# Patient Record
Sex: FEMALE | Race: WHITE | Employment: OTHER | URBAN - METROPOLITAN AREA
[De-identification: names, ages, dates, MRNs, and addresses within clinical notes are randomized per-mention and may not be internally consistent; named-entity substitution may affect disease eponyms.]

---

## 2017-09-14 ENCOUNTER — TELEPHONE (OUTPATIENT)
Dept: INTERNAL MEDICINE CLINIC | Age: 72
End: 2017-09-14

## 2017-10-30 ENCOUNTER — TELEPHONE (OUTPATIENT)
Dept: INTERNAL MEDICINE CLINIC | Age: 72
End: 2017-10-30

## 2017-12-22 ENCOUNTER — OFFICE VISIT (OUTPATIENT)
Dept: RHEUMATOLOGY | Age: 72
End: 2017-12-22

## 2017-12-22 ENCOUNTER — HOSPITAL ENCOUNTER (OUTPATIENT)
Dept: OTHER | Age: 72
Discharge: HOME | End: 2017-12-22
Attending: INTERNAL MEDICINE | Admitting: INTERNAL MEDICINE

## 2017-12-22 VITALS
DIASTOLIC BLOOD PRESSURE: 72 MMHG | SYSTOLIC BLOOD PRESSURE: 130 MMHG | HEIGHT: 61 IN | WEIGHT: 125 LBS | HEART RATE: 88 BPM | BODY MASS INDEX: 23.6 KG/M2

## 2017-12-22 DIAGNOSIS — I26.99 OTHER ACUTE PULMONARY EMBOLISM WITHOUT ACUTE COR PULMONALE (HCC): ICD-10-CM

## 2017-12-22 DIAGNOSIS — R10.12 LEFT UPPER QUADRANT PAIN: ICD-10-CM

## 2017-12-22 DIAGNOSIS — M05.79 RHEUMATOID ARTHRITIS INVOLVING MULTIPLE SITES WITH POSITIVE RHEUMATOID FACTOR (HCC): Primary | ICD-10-CM

## 2017-12-22 DIAGNOSIS — M05.79 RHEUMATOID ARTHRITIS INVOLVING MULTIPLE SITES WITH POSITIVE RHEUMATOID FACTOR (HCC): ICD-10-CM

## 2017-12-22 LAB
A/G RATIO: 1 (ref 1.1–2.2)
ALBUMIN SERPL-MCNC: 3.9 G/DL (ref 3.4–5)
ALP BLD-CCNC: 80 U/L (ref 40–129)
ALT SERPL-CCNC: 14 U/L (ref 10–40)
ANION GAP SERPL CALCULATED.3IONS-SCNC: 10 MMOL/L (ref 3–16)
AST SERPL-CCNC: 24 U/L (ref 15–37)
BASOPHILS ABSOLUTE: 0.1 K/UL (ref 0–0.2)
BASOPHILS RELATIVE PERCENT: 1.3 %
BILIRUB SERPL-MCNC: 0.4 MG/DL (ref 0–1)
BUN BLDV-MCNC: 15 MG/DL (ref 7–20)
C-REACTIVE PROTEIN: 3 MG/L (ref 0–5.1)
CALCIUM SERPL-MCNC: 9.8 MG/DL (ref 8.3–10.6)
CHLORIDE BLD-SCNC: 105 MMOL/L (ref 99–110)
CO2: 29 MMOL/L (ref 21–32)
CREAT SERPL-MCNC: 0.7 MG/DL (ref 0.6–1.2)
EOSINOPHILS ABSOLUTE: 0.2 K/UL (ref 0–0.6)
EOSINOPHILS RELATIVE PERCENT: 2.6 %
GFR AFRICAN AMERICAN: >60
GFR NON-AFRICAN AMERICAN: >60
GLOBULIN: 3.8 G/DL
GLUCOSE BLD-MCNC: 105 MG/DL (ref 70–99)
HCT VFR BLD CALC: 38.3 % (ref 36–48)
HEMOGLOBIN: 12.6 G/DL (ref 12–16)
HEPATITIS B CORE IGM ANTIBODY: NORMAL
HEPATITIS B SURFACE ANTIGEN INTERPRETATION: NORMAL
HEPATITIS C ANTIBODY INTERPRETATION: NORMAL
LYMPHOCYTES ABSOLUTE: 1.1 K/UL (ref 1–5.1)
LYMPHOCYTES RELATIVE PERCENT: 14.6 %
MCH RBC QN AUTO: 29.8 PG (ref 26–34)
MCHC RBC AUTO-ENTMCNC: 32.8 G/DL (ref 31–36)
MCV RBC AUTO: 90.8 FL (ref 80–100)
MONOCYTES ABSOLUTE: 0.4 K/UL (ref 0–1.3)
MONOCYTES RELATIVE PERCENT: 5.3 %
NEUTROPHILS ABSOLUTE: 5.9 K/UL (ref 1.7–7.7)
NEUTROPHILS RELATIVE PERCENT: 76.2 %
PDW BLD-RTO: 14.5 % (ref 12.4–15.4)
PLATELET # BLD: 286 K/UL (ref 135–450)
PMV BLD AUTO: 9.6 FL (ref 5–10.5)
POTASSIUM SERPL-SCNC: 4.7 MMOL/L (ref 3.5–5.1)
RBC # BLD: 4.22 M/UL (ref 4–5.2)
SEDIMENTATION RATE, ERYTHROCYTE: 48 MM/HR (ref 0–30)
SODIUM BLD-SCNC: 144 MMOL/L (ref 136–145)
TOTAL PROTEIN: 7.7 G/DL (ref 6.4–8.2)
WBC # BLD: 7.8 K/UL (ref 4–11)

## 2017-12-22 PROCEDURE — G8484 FLU IMMUNIZE NO ADMIN: HCPCS | Performed by: INTERNAL MEDICINE

## 2017-12-22 PROCEDURE — G8400 PT W/DXA NO RESULTS DOC: HCPCS | Performed by: INTERNAL MEDICINE

## 2017-12-22 PROCEDURE — 1123F ACP DISCUSS/DSCN MKR DOCD: CPT | Performed by: INTERNAL MEDICINE

## 2017-12-22 PROCEDURE — G8598 ASA/ANTIPLAT THER USED: HCPCS | Performed by: INTERNAL MEDICINE

## 2017-12-22 PROCEDURE — 99215 OFFICE O/P EST HI 40 MIN: CPT | Performed by: INTERNAL MEDICINE

## 2017-12-22 PROCEDURE — 4004F PT TOBACCO SCREEN RCVD TLK: CPT | Performed by: INTERNAL MEDICINE

## 2017-12-22 PROCEDURE — 3017F COLORECTAL CA SCREEN DOC REV: CPT | Performed by: INTERNAL MEDICINE

## 2017-12-22 PROCEDURE — G8420 CALC BMI NORM PARAMETERS: HCPCS | Performed by: INTERNAL MEDICINE

## 2017-12-22 PROCEDURE — 3014F SCREEN MAMMO DOC REV: CPT | Performed by: INTERNAL MEDICINE

## 2017-12-22 PROCEDURE — 1090F PRES/ABSN URINE INCON ASSESS: CPT | Performed by: INTERNAL MEDICINE

## 2017-12-22 PROCEDURE — G8427 DOCREV CUR MEDS BY ELIG CLIN: HCPCS | Performed by: INTERNAL MEDICINE

## 2017-12-22 PROCEDURE — 4040F PNEUMOC VAC/ADMIN/RCVD: CPT | Performed by: INTERNAL MEDICINE

## 2017-12-22 NOTE — PROGRESS NOTES
2017  Patient Name: Jatinder Durbin  : 1945  Medical Record: [id number]    MEDICATIONS  Current Outpatient Prescriptions   Medication Sig Dispense Refill    rivaroxaban (XARELTO) 15 MG TABS tablet Take 15 mg by mouth 2 times daily PT said that when she finishes the bottle it will change to 20 mg 1 per day      predniSONE (DELTASONE) 5 MG tablet Take 2 tablets by mouth  daily 180 tablet 3    morphine (MSIR) 30 MG tablet Take 30 mg by mouth 3 times daily.  morphine (MSIR) 15 MG tablet Take 15 mg by mouth 3 times daily.  docusate sodium (COLACE) 100 MG capsule Take 100 mg by mouth 2 times daily.  pravastatin (PRAVACHOL) 80 MG tablet Take 80 mg by mouth daily.  clonazepam (KLONOPIN) 0.5 MG tablet Take 0.5 mg by mouth 3 times daily as needed. No current facility-administered medications for this visit. ALLERGIES  Allergies   Allergen Reactions    Leflunomide Other (See Comments)     Increased pain     Oxycontin [Oxycodone Hcl]     Sulfa Antibiotics          Comments  No specialty comments available. REFERRING PHYSICIAN: Melissa Pizarro MD     HISTORY OF PRESENT ILLNESS  Jatinder Durbin is a 71 y.o. female who is being seen for follow up evaluation of Rheumatoid arthritis. Patient is a transfer from Dr. Leslie King. Dr. Walker Serum notes were reviewed. She was diagnosed with rheumatoid arthritis in . She has been on prednisone 5 mg per day since . She has last seen Dr. Leslie King in 2015. She has been reluctant to take DMARD's. She was started on methotrexate, stopped it after taking 1 dose. She was placed on leflunomide, stopped taking it due to severe body aches which she attributed to leflunomide. She feels she is doing better on prednisone 5 mg per day. She doubles the dose when she gets a flareup. She has pain and swelling mainly in the wrists and stiffness in the morning lasting for 2 hours. She has difficulty opening doorknobs and jar cans. She has occasional pain in elbows and knees. She was recently diagnosed with pulmonary embolism. She was started on Xarelto. She is also complaining of pain in the left upper quadrant which is worse with sitting, leaning forward and rolling over. HPI  ROS    REVIEW OF SYSTEMS: Positive for dry mouth, pulmonary embolism  Constitutional: No unanticipated weight loss or fevers. No fatigue and malaise. Integumentary: No rash, photosensitivity, malar rash, livedo reticularis, alopecia and Raynaud's symptoms, sclerodactyly, skin tightening  Eyes: negative for dry eyes, visual disturbance and persistent redness, discharge from eyes   ENT: - No tinnitus, loss of hearing, vertigo, or recurrent ear infections.  - No history of nasal/oral ulcers. Cardiovascular: No history of pericarditis, chest pain or murmur or palpitations  Respiratory: No shortness of breath, cough or history of interstitial lung disease. No history of pleurisy. No history of tuberculosis or atypical infections. Gastrointestinal: No history of heart burn, dysphagia or esophageal dysmotility. No change in bowel habits or any inflammatory bowel disease. Genitourinary: No history renal disease, miscarriages. Hematologic/Lymphatic: No abnormal bruising or bleeding,  swollen lymph nodes. Neurological: No history seizure or focal weakness. No history of neuropathies, paresthesias or hyperesthesias, facial droop, diplopia  Psychiatric: No history of bipolar disease, anxiety, depression  Endocrine: Denies any thyroid / parathyroid disease and osteoporosis  Allergic/Immunologic: No nasal congestion or hives. I have reviewed patients Past medical History, Social History and Family History as mentioned in her chart and this remains unchanged from previous.     Past Medical History:   Diagnosis Date    Arthritis     CAD (coronary artery disease)     Coronary atherosclerosis of native coronary vessel     Headache(784.0)     Hyperlipidemia     Hypertension     Pulmonary embolism (HCC)     Type II or unspecified type diabetes mellitus without mention of complication, not stated as uncontrolled      Past Surgical History:   Procedure Laterality Date    BACK SURGERY      CARDIAC CATHETERIZATION      HAND SURGERY      HIP SURGERY      HYSTERECTOMY      KNEE SURGERY      WRIST SURGERY       Social History     Social History    Marital status:      Spouse name: N/A    Number of children: N/A    Years of education: N/A     Occupational History    Not on file.      Social History Main Topics    Smoking status: Current Some Day Smoker     Packs/day: 1.00     Years: 35.00     Types: E-Cigarettes, Cigarettes    Smokeless tobacco: Never Used    Alcohol use No    Drug use: No    Sexual activity: Not on file     Other Topics Concern    Not on file     Social History Narrative    No narrative on file     Family History   Problem Relation Age of Onset    Early Death Father     Heart Disease Father     Cancer Mother          PHYSICAL EXAM   Vitals:    12/22/17 0950   BP: 130/72   Pulse: 88   Weight: 125 lb (56.7 kg)   Height: 5' 1\" (1.549 m)     Physical Exam  Constitutional:  Well developed, well nourished, no acute distress, non-toxic appearance   Musculoskeletal:     RIGHT  Swell  Tender  ROM  LEFT  Swell  Tender  ROM    DIP2  0  0  FULL   0  0  FULL    DIP3  0  0  FULL   0  0  FULL    DIP4  0  0  FULL   0  0  FULL    DIP5  0  0  FULL   0  0  FULL    PIP1  0  0  FULL   0  0  FULL    PIP2  0  0  FULL   0  0  FULL    PIP3  0  0  FULL   0  0  FULL    PIP4  0  0  FULL   0  0  FULL    PIP5  0  0  FULL   0  0  FULL    MCP1  0  0  FULL   0  0  FULL    MCP2  0  0  FULL   0  0  FULL    MCP3  0  0  FULL   0  0  FULL    MCP4  0  0  FULL   0  0  FULL    MCP5  0  0  FULL   0  0  FULL    Wrist  + + decreased  + + decreased   Elbow  0  0  FULL   0  0  FULL    Shouldr  0  0  FULL   0  0  FULL    Hip  0  0  FULL   0  0  FULL    Knee  0  0  FULL   0  0  FULL Ankle  0  0  FULL   0  0  FULL    MTP1  0  0  FULL   0  0  FULL    MTP2  0  0  FULL   0  0  FULL    MTP3  0  0  FULL   0  0  FULL    MTP4  0  0  FULL   0  0  FULL    MTP5  0  0  FULL   0  0  FULL    IP1  0  0  FULL   0  0  FULL    IP2  0  0  FULL   0  0  FULL    IP3  0  0  FULL   0  0  FULL    IP4  0  0  FULL   0  0  FULL    IP5  0  0  FULL   0 0 FULL       Ambulates without assistance, normal gait  Neck: Full ROM, no tenderness, supple   Back- no tenderness. Eyes:  PERRL, extra ocular movements intact, conjunctiva normal   HEENT:  Atraumatic, normocephalic, external ears normal, oropharynx moist, no pharyngeal exudates. Respiratory:  No respiratory distress  GI:  Soft, nondistended, normal bowel sounds, nontender, no organomegaly, no mass, no rebound, no guarding   :  No costovertebral angle tenderness   Integument:  Well hydrated, no rash or telangiectasias  Lymphatic:  No lymphadenopathy noted   Neurologic:   Alert & oriented x 3, CN 2-12 normal, no focal deficits noted. Sensations Intact. Muscle strength 5/5 proximally and distally in upper and lower extremities.    Psychiatric:  Speech and behavior appropriate           LABS AND IMAGING  Outside data reviewed and in HPI    Lab Results   Component Value Date    WBC 10.5 08/03/2016    RBC 4.56 08/03/2016    HGB 13.4 08/03/2016    HCT 40.2 08/03/2016     08/03/2016    MCV 88.3 08/03/2016    MCH 29.4 08/03/2016    MCHC 33.3 08/03/2016    RDW 14.1 08/03/2016    LYMPHOPCT 12.3 08/03/2016    MONOPCT 4.3 08/03/2016    BASOPCT 2.7 08/03/2016    MONOSABS 0.5 08/03/2016    LYMPHSABS 1.3 08/03/2016    EOSABS 0.1 08/03/2016    BASOSABS 0.3 08/03/2016       Chemistry        Component Value Date/Time     08/03/2016 1330    K 4.4 08/03/2016 1330     08/03/2016 1330    CO2 25 08/03/2016 1330    BUN 14 08/03/2016 1330    CREATININE 0.7 08/03/2016 1330        Component Value Date/Time    CALCIUM 9.9 08/03/2016 1330    ALKPHOS 113 08/03/2016 1330    AST

## 2017-12-24 LAB
ANA BY IFA: ABNORMAL
ANTICARDIOLIPIN IGA ANTIBODY: 0 APL (ref 0–11)
ANTICARDIOLIPIN IGG ANTIBODY: 4 GPL (ref 0–14)
CARDIOLIPIN AB IGM: 41 MPL (ref 0–12)

## 2017-12-25 LAB
BETA-2 GLYCOPROTEIN 1 IGG ANTIBODY: 0 SGU (ref 0–20)
BETA-2 GLYCOPROTEIN 1 IGM ANTIBODY: 8 SMU (ref 0–20)

## 2017-12-26 DIAGNOSIS — R76.8 POSITIVE ANA (ANTINUCLEAR ANTIBODY): Primary | ICD-10-CM

## 2017-12-26 LAB
DRVVT CONFIRMATION TEST: NEGATIVE RATIO
DRVVT SCREEN: 82 SEC (ref 33–44)
DRVVT,DIL: 65 SEC (ref 33–44)
HEXAGONAL PHOSPHOLIPID NEUTRALIZAT TEST: ABNORMAL
LUPUS ANTICOAG INTERP: ABNORMAL
PLT NEUTA: POSITIVE
PT D: 22.7 SEC (ref 12–15.5)
PTT D: 78 SEC (ref 32–48)
PTT-D CORR REFLEX: 56 SEC (ref 32–48)
PTT-HEPARIN NEUTRALIZED: ABNORMAL SEC (ref 32–48)
REPTILASE TIME: ABNORMAL SEC
THROMBIN TIME: 18 SEC (ref 14.7–19.5)

## 2017-12-26 NOTE — PROGRESS NOTES
Please call the patient and let her know that she has elevated inflammatory markers and positive lupus test.  We will do more blood work for further evaluation.

## 2017-12-29 ENCOUNTER — HOSPITAL ENCOUNTER (OUTPATIENT)
Dept: ULTRASOUND IMAGING | Age: 72
Discharge: HOME | End: 2017-12-29
Attending: INTERNAL MEDICINE | Admitting: INTERNAL MEDICINE

## 2017-12-29 DIAGNOSIS — R76.8 POSITIVE ANA (ANTINUCLEAR ANTIBODY): ICD-10-CM

## 2017-12-29 DIAGNOSIS — R10.12 LEFT UPPER QUADRANT PAIN: ICD-10-CM

## 2017-12-29 LAB
C3 COMPLEMENT: 116.1 MG/DL (ref 90–180)
C4 COMPLEMENT: 19 MG/DL (ref 10–40)

## 2017-12-31 LAB
DOUBLE STRANDED DNA AB, IGG: DETECTED
ENA TO SMITH (SM) ANTIBODY: 0 AU/ML (ref 0–40)
SSA 52 (RO) (ENA) AB, IGG: 9 AU/ML (ref 0–40)
SSA 60 (RO) (ENA) AB, IGG: 1 AU/ML (ref 0–40)

## 2018-01-01 LAB
DSDNA ANTIBODY TITER: ABNORMAL
ENA TO RNP ANTIBODY: NEGATIVE EU
ENA TO SMITH (SM) ANTIBODY: NEGATIVE EU

## 2018-01-02 DIAGNOSIS — K83.8 DILATED BILE DUCT: Primary | ICD-10-CM

## 2018-01-02 NOTE — PROGRESS NOTES
Please call and let her know blood test is positive for lupus  Continue same meds.  Will discuss treatment options at follow up

## 2018-01-02 NOTE — PROGRESS NOTES
Please call the patient and let her know ultrasound shows dilated common bile duct.  I will refer to surgery for further evaluation

## 2018-01-03 ENCOUNTER — TELEPHONE (OUTPATIENT)
Dept: RHEUMATOLOGY | Age: 73
End: 2018-01-03

## 2018-01-03 DIAGNOSIS — K83.8 DILATED BILE DUCT: Primary | ICD-10-CM

## 2018-01-10 ENCOUNTER — OFFICE VISIT (OUTPATIENT)
Dept: SURGERY | Age: 73
End: 2018-01-10

## 2018-01-10 VITALS
WEIGHT: 127 LBS | SYSTOLIC BLOOD PRESSURE: 120 MMHG | HEIGHT: 62 IN | BODY MASS INDEX: 23.37 KG/M2 | DIASTOLIC BLOOD PRESSURE: 72 MMHG

## 2018-01-10 DIAGNOSIS — K83.8 DILATED BILE DUCT: Primary | ICD-10-CM

## 2018-01-10 PROCEDURE — 3017F COLORECTAL CA SCREEN DOC REV: CPT | Performed by: SURGERY

## 2018-01-10 PROCEDURE — 99204 OFFICE O/P NEW MOD 45 MIN: CPT | Performed by: SURGERY

## 2018-01-10 PROCEDURE — 3014F SCREEN MAMMO DOC REV: CPT | Performed by: SURGERY

## 2018-01-10 PROCEDURE — 4040F PNEUMOC VAC/ADMIN/RCVD: CPT | Performed by: SURGERY

## 2018-01-10 PROCEDURE — G8427 DOCREV CUR MEDS BY ELIG CLIN: HCPCS | Performed by: SURGERY

## 2018-01-10 PROCEDURE — G8484 FLU IMMUNIZE NO ADMIN: HCPCS | Performed by: SURGERY

## 2018-01-10 PROCEDURE — G8420 CALC BMI NORM PARAMETERS: HCPCS | Performed by: SURGERY

## 2018-01-10 PROCEDURE — 1090F PRES/ABSN URINE INCON ASSESS: CPT | Performed by: SURGERY

## 2018-01-10 ASSESSMENT — ENCOUNTER SYMPTOMS
ABDOMINAL PAIN: 1
CONSTIPATION: 1
RESPIRATORY NEGATIVE: 1
EYES NEGATIVE: 1
ALLERGIC/IMMUNOLOGIC NEGATIVE: 1
BACK PAIN: 1
BLOOD IN STOOL: 1

## 2018-01-10 NOTE — PROGRESS NOTES
Subjective:      Kera Patiño is a 71 y.o. female     CC: Dilated bile duct    HPI: 50-year-old female who was recently diagnosed with a pulmonary embolus who is here for evaluation of a dilated bile duct. She was having some symptoms of epigastric discomfort. The pain was very short in duration and seemed to be positional.  Right upper quadrant ultrasound showed a dilated bile duct. There were no gallstones noted. She has had no history of jaundice, acholic stools or dark urine. No history of nausea, vomiting, anorexia, weight loss or change in bowel habits. [unfilled]    Prior to Visit Medications    Medication Sig Taking? Authorizing Provider   rivaroxaban (XARELTO) 15 MG TABS tablet Take 15 mg by mouth 2 times daily PT said that when she finishes the bottle it will change to 20 mg 1 per day Yes Historical Provider, MD   morphine (MSIR) 30 MG tablet Take 30 mg by mouth 3 times daily. Yes Historical Provider, MD   morphine (MSIR) 15 MG tablet Take 15 mg by mouth 3 times daily. Yes Historical Provider, MD   docusate sodium (COLACE) 100 MG capsule Take 100 mg by mouth 2 times daily. Yes Historical Provider, MD   pravastatin (PRAVACHOL) 80 MG tablet Take 80 mg by mouth daily. Yes Historical Provider, MD   clonazepam (KLONOPIN) 0.5 MG tablet Take 0.5 mg by mouth 3 times daily as needed. Yes Historical Provider, MD   predniSONE (DELTASONE) 5 MG tablet Take 2 tablets by mouth  daily  Amarjit Killian MD       Social History     Social History    Marital status:      Spouse name: N/A    Number of children: N/A    Years of education: N/A     Occupational History    Not on file.      Social History Main Topics    Smoking status: Current Some Day Smoker     Packs/day: 1.00     Years: 35.00     Types: E-Cigarettes, Cigarettes    Smokeless tobacco: Never Used    Alcohol use No    Drug use: No    Sexual activity: Not on file     Other Topics Concern    Not on file     Social History

## 2018-02-12 ENCOUNTER — OFFICE VISIT (OUTPATIENT)
Dept: RHEUMATOLOGY | Age: 73
End: 2018-02-12

## 2018-02-12 VITALS
SYSTOLIC BLOOD PRESSURE: 110 MMHG | HEIGHT: 61 IN | DIASTOLIC BLOOD PRESSURE: 80 MMHG | WEIGHT: 120 LBS | BODY MASS INDEX: 22.66 KG/M2

## 2018-02-12 DIAGNOSIS — M05.79 RHEUMATOID ARTHRITIS INVOLVING MULTIPLE SITES WITH POSITIVE RHEUMATOID FACTOR (HCC): Primary | ICD-10-CM

## 2018-02-12 DIAGNOSIS — M32.9 SLE (SYSTEMIC LUPUS ERYTHEMATOSUS RELATED SYNDROME) (HCC): ICD-10-CM

## 2018-02-12 DIAGNOSIS — R82.998 CALCIUM OXALATE CRYSTALS IN URINE: Primary | ICD-10-CM

## 2018-02-12 LAB
ALT SERPL-CCNC: 20 U/L (ref 10–40)
AST SERPL-CCNC: 27 U/L (ref 15–37)
BASOPHILS ABSOLUTE: 0.1 K/UL (ref 0–0.2)
BASOPHILS RELATIVE PERCENT: 0.8 %
BILIRUBIN URINE: ABNORMAL
BLOOD, URINE: NEGATIVE
CLARITY: ABNORMAL
COLOR: YELLOW
CREAT SERPL-MCNC: 0.7 MG/DL (ref 0.6–1.2)
CREATININE URINE: 117.6 MG/DL (ref 28–259)
CRYSTALS, UA: ABNORMAL /HPF
EOSINOPHILS ABSOLUTE: 0.1 K/UL (ref 0–0.6)
EOSINOPHILS RELATIVE PERCENT: 0.7 %
EPITHELIAL CELLS, UA: 1 /HPF (ref 0–5)
GFR AFRICAN AMERICAN: >60
GFR NON-AFRICAN AMERICAN: >60
GLUCOSE URINE: NEGATIVE MG/DL
HCT VFR BLD CALC: 37.6 % (ref 36–48)
HEMOGLOBIN: 12.7 G/DL (ref 12–16)
HYALINE CASTS: 0 /LPF (ref 0–8)
KETONES, URINE: NEGATIVE MG/DL
LEUKOCYTE ESTERASE, URINE: NEGATIVE
LYMPHOCYTES ABSOLUTE: 1.1 K/UL (ref 1–5.1)
LYMPHOCYTES RELATIVE PERCENT: 10.8 %
MCH RBC QN AUTO: 29.4 PG (ref 26–34)
MCHC RBC AUTO-ENTMCNC: 33.8 G/DL (ref 31–36)
MCV RBC AUTO: 87 FL (ref 80–100)
MICROSCOPIC EXAMINATION: YES
MONOCYTES ABSOLUTE: 0.6 K/UL (ref 0–1.3)
MONOCYTES RELATIVE PERCENT: 6 %
NEUTROPHILS ABSOLUTE: 8.4 K/UL (ref 1.7–7.7)
NEUTROPHILS RELATIVE PERCENT: 81.7 %
NITRITE, URINE: NEGATIVE
PDW BLD-RTO: 14.5 % (ref 12.4–15.4)
PH UA: 5.5
PLATELET # BLD: 268 K/UL (ref 135–450)
PMV BLD AUTO: 9.4 FL (ref 5–10.5)
PROTEIN PROTEIN: 12 MG/DL
PROTEIN UA: NEGATIVE MG/DL
RBC # BLD: 4.32 M/UL (ref 4–5.2)
RBC UA: 3 /HPF (ref 0–4)
SPECIFIC GRAVITY UA: 1.02
URINE REFLEX TO CULTURE: ABNORMAL
URINE TYPE: ABNORMAL
UROBILINOGEN, URINE: 0.2 E.U./DL
WBC # BLD: 10.2 K/UL (ref 4–11)
WBC UA: 1 /HPF (ref 0–5)

## 2018-02-12 PROCEDURE — 3017F COLORECTAL CA SCREEN DOC REV: CPT | Performed by: INTERNAL MEDICINE

## 2018-02-12 PROCEDURE — 4004F PT TOBACCO SCREEN RCVD TLK: CPT | Performed by: INTERNAL MEDICINE

## 2018-02-12 PROCEDURE — 99215 OFFICE O/P EST HI 40 MIN: CPT | Performed by: INTERNAL MEDICINE

## 2018-02-12 PROCEDURE — G8400 PT W/DXA NO RESULTS DOC: HCPCS | Performed by: INTERNAL MEDICINE

## 2018-02-12 PROCEDURE — G8427 DOCREV CUR MEDS BY ELIG CLIN: HCPCS | Performed by: INTERNAL MEDICINE

## 2018-02-12 PROCEDURE — 1123F ACP DISCUSS/DSCN MKR DOCD: CPT | Performed by: INTERNAL MEDICINE

## 2018-02-12 PROCEDURE — G8420 CALC BMI NORM PARAMETERS: HCPCS | Performed by: INTERNAL MEDICINE

## 2018-02-12 PROCEDURE — G8484 FLU IMMUNIZE NO ADMIN: HCPCS | Performed by: INTERNAL MEDICINE

## 2018-02-12 PROCEDURE — G8598 ASA/ANTIPLAT THER USED: HCPCS | Performed by: INTERNAL MEDICINE

## 2018-02-12 PROCEDURE — 3014F SCREEN MAMMO DOC REV: CPT | Performed by: INTERNAL MEDICINE

## 2018-02-12 PROCEDURE — 1090F PRES/ABSN URINE INCON ASSESS: CPT | Performed by: INTERNAL MEDICINE

## 2018-02-12 PROCEDURE — 4040F PNEUMOC VAC/ADMIN/RCVD: CPT | Performed by: INTERNAL MEDICINE

## 2018-02-12 RX ORDER — FOLIC ACID 1 MG/1
1 TABLET ORAL DAILY
Qty: 30 TABLET | Refills: 5 | Status: SHIPPED | OUTPATIENT
Start: 2018-02-12 | End: 2018-05-21

## 2018-02-12 RX ORDER — HYDROXYCHLOROQUINE SULFATE 200 MG/1
200 TABLET, FILM COATED ORAL 2 TIMES DAILY
Qty: 30 TABLET | Refills: 5 | Status: SHIPPED | OUTPATIENT
Start: 2018-02-12 | End: 2018-06-07 | Stop reason: SDUPTHER

## 2018-02-12 NOTE — PROGRESS NOTES
PHYSICAL EXAM   Vitals:    02/12/18 1306   BP: 110/80   Weight: 120 lb (54.4 kg)   Height: 5' 1\" (1.549 m)     Physical Exam  Constitutional:  Well developed, well nourished, no acute distress, non-toxic appearance   Musculoskeletal:     RIGHT  Swell  Tender  ROM  LEFT  Swell  Tender  ROM    DIP2  0  0  FULL   0  0  FULL    DIP3  0  0  FULL   0  0  FULL    DIP4  0  0  FULL   0  0  FULL    DIP5  0  0  FULL   0  0  FULL    PIP1  0  0  FULL   0  0  FULL    PIP2  0  0  FULL   0  0  FULL    PIP3  0  0  FULL   0  0  FULL    PIP4  0  0  FULL   0  0  FULL    PIP5  0  0  FULL   0  0  FULL    MCP1  0  0  FULL   0  0  FULL    MCP2  0  0  FULL   0  0  FULL    MCP3  0  0  FULL   0  0  FULL    MCP4  0  0  FULL   0  0  FULL    MCP5  0  0  FULL   0  0  FULL    Wrist  + + decreased  + + decreased   Elbow  0  0  FULL   0  0  FULL    Shouldr  0  0  FULL   0  0  FULL    Hip  0  0  FULL   0  0  FULL    Knee  0  0  FULL   0  0  FULL    Ankle  0  0  FULL   0  0  FULL    MTP1  0  0  FULL   0  0  FULL    MTP2  0  0  FULL   0  0  FULL    MTP3  0  0  FULL   0  0  FULL    MTP4  0  0  FULL   0  0  FULL    MTP5  0  0  FULL   0  0  FULL    IP1  0  0  FULL   0  0  FULL    IP2  0  0  FULL   0  0  FULL    IP3  0  0  FULL   0  0  FULL    IP4  0  0  FULL   0  0  FULL    IP5  0  0  FULL   0 0 FULL       Ambulates without assistance, normal gait  Neck: Full ROM, no tenderness, supple   Back- no tenderness. Eyes:  PERRL, extra ocular movements intact, conjunctiva normal   HEENT:  Atraumatic, normocephalic, external ears normal, oropharynx moist, no pharyngeal exudates. Respiratory:  No respiratory distress  GI:  Soft, nondistended, normal bowel sounds, nontender, no organomegaly, no mass, no rebound, no guarding   :  No costovertebral angle tenderness   Integument:  Well hydrated, no rash or telangiectasias  Lymphatic:  No lymphadenopathy noted   Neurologic:   Alert & oriented x 3, CN 2-12 normal, no focal deficits noted. Sensations Intact. Muscle strength 5/5 proximally and distally in upper and lower extremities. Psychiatric:  Speech and behavior appropriate           LABS AND IMAGING  Outside data reviewed and in HPI    Lab Results   Component Value Date    WBC 7.8 12/22/2017    RBC 4.22 12/22/2017    HGB 12.6 12/22/2017    HCT 38.3 12/22/2017     12/22/2017    MCV 90.8 12/22/2017    MCH 29.8 12/22/2017    MCHC 32.8 12/22/2017    RDW 14.5 12/22/2017    LYMPHOPCT 14.6 12/22/2017    MONOPCT 5.3 12/22/2017    BASOPCT 1.3 12/22/2017    MONOSABS 0.4 12/22/2017    LYMPHSABS 1.1 12/22/2017    EOSABS 0.2 12/22/2017    BASOSABS 0.1 12/22/2017       Chemistry        Component Value Date/Time     12/22/2017 1141    K 4.7 12/22/2017 1141     12/22/2017 1141    CO2 29 12/22/2017 1141    BUN 15 12/22/2017 1141    CREATININE 0.7 12/22/2017 1141        Component Value Date/Time    CALCIUM 9.8 12/22/2017 1141    ALKPHOS 80 12/22/2017 1141    AST 24 12/22/2017 1141    ALT 14 12/22/2017 1141    BILITOT 0.4 12/22/2017 1141          Lab Results   Component Value Date    SEDRATE 48 (H) 12/22/2017     Lab Results   Component Value Date    CRP 3.0 12/22/2017     Lab Results   Component Value Date    C3 116.1 12/29/2017    C4 19.0 12/29/2017     Lab Results   Component Value Date    RF 48.0 02/26/2014    CCPABIGG 196 02/26/2014     ASSESSMENT AND PLAN      Assessment/Plan:      ASSESSMENT:    1. Rheumatoid arthritis involving multiple sites with positive rheumatoid factor ([address]    2. SLE (systemic lupus erythematosus related syndrome) ([address]    3. Effect of chemotherapy, initial encounter        PLAN:   1. Rheumatoid arthritis involving multiple sites with positive rheumatoid factor (HCC)  RF, CCP positive  -Synovitis in wrists   -Unable to taper off prednisone below 10 mg daily  -I will start methotrexate, folic acid and Plaquenil  - continue prednisone 10 mg daily  - methotrexate (RHEUMATREX) 2.5 MG chemo tablet;  Take 4 pills/week for 2 weeks and then increase to 6 pills/week  Dispense: 30 tablet; Refill: 1  - folic acid (FOLVITE) 1 MG tablet; Take 1 tablet by mouth daily  Dispense: 30 tablet; Refill: 5  - hydroxychloroquine (PLAQUENIL) 200 MG tablet; Take 1 tablet by mouth 2 times daily  Dispense: 30 tablet; Refill: 5  Methotrexate increases the risk of infections, birth defects, liver toxicity, rare lung problems, probable malignancy and bone marrow toxicity and need to check the blood counts every month for first 3 months and then every 2 months and it was discussed in detail with the patient. Alcohol abstinence is advised while taking methotrexate. 2. SLE (systemic lupus erythematosus related syndrome) (HCC)  Positive HENNY, positive dsDNA,  Blood clots, alopecia, raynauds, dry mouth, positive APL panel, elevated ESR  -Start Plaquenil 200 mg daily  -Urine for proteinuria  -Sun protection with UVA/UVB S 55 or above  -Flu and pneumonia shot  - RIBONUCLEIC PROTEIN ANTIBODY; Future  - Sjogrens syndrome-B extractable nuclear antibody; Future  - Urinalysis Reflex to Culture; Future  - Protein, urine, random; Future  - Creatinine, Random Urine; Future  - hydroxychloroquine (PLAQUENIL) 200 MG tablet; Take 1 tablet by mouth 2 times daily  Dispense: 30 tablet; Refill: 5    3. Effect of chemotherapy, initial encounter  Labs every 4 weeks  - ALT; Standing  - AST; Standing  - CBC Auto Differential; Standing  - Creatinine, Serum; Standing     Plaquenil can cause skin rash, GI issues, visual blurriness and increases the risk of retinal toxicity; yearly eye/retinal exam was advised while taking plaquenil. The patient indicates understanding of these issues and agrees with the plan. Time spent with the patient 40 minutes and half of the time spent with counseling on medication side effects, diagnosis, treatment options. Return in about 4 weeks (around 3/12/2018).       The risks and benefits of my recommendations, as well as other treatment options, benefits and side effects were discussed with the patient. All questions were answered. ######################################################################    I thank you for giving me the opportunity to participate in [facility]. If you have any questions or concerns please feel free to contact me. I look forward to following  Deidre Ocampo along with you. Electronically signed by: Pedro Bland MD, 2/12/2018 1:30 PM    Documentation was done using voice recognition dragon software. Every effort was made to ensure accuracy; however, inadvertent unintentional computerized transcription errors may be present.

## 2018-02-13 NOTE — PROGRESS NOTES
Please call the patient and let her know that she will be referred to the nephrologist due to calcium oxalate crystals in the urine.

## 2018-02-14 ENCOUNTER — TELEPHONE (OUTPATIENT)
Dept: INTERNAL MEDICINE CLINIC | Age: 73
End: 2018-02-14

## 2018-02-14 DIAGNOSIS — R82.998 CALCIUM OXALATE CRYSTALS IN URINE: Primary | ICD-10-CM

## 2018-02-14 LAB
ENA TO RNP ANTIBODY: 1 AU/ML (ref 0–40)
ENA TO SSB (LA) ANTIBODY: 1 AU/ML (ref 0–40)

## 2018-02-14 NOTE — TELEPHONE ENCOUNTER
Patient states Dr Valdo Walter referred her to Dr Warren Wisdom. This [facility] [place], he only goes to [place]. She lives in [facility] and asked if Dr Valdo Walter can refer someone closer.

## 2018-02-28 ENCOUNTER — TELEPHONE (OUTPATIENT)
Dept: INTERNAL MEDICINE CLINIC | Age: 73
End: 2018-02-28

## 2018-03-06 ENCOUNTER — TELEPHONE (OUTPATIENT)
Dept: INTERNAL MEDICINE CLINIC | Age: 73
End: 2018-03-06

## 2018-03-06 NOTE — TELEPHONE ENCOUNTER
Please call the patient and let her know that she needs to talk to her PCP for problems with bowel movements.

## 2018-03-23 ENCOUNTER — OFFICE VISIT (OUTPATIENT)
Dept: RHEUMATOLOGY | Age: 73
End: 2018-03-23

## 2018-03-23 VITALS
DIASTOLIC BLOOD PRESSURE: 70 MMHG | SYSTOLIC BLOOD PRESSURE: 114 MMHG | BODY MASS INDEX: 24.09 KG/M2 | HEART RATE: 71 BPM | WEIGHT: 127.6 LBS | OXYGEN SATURATION: 98 % | HEIGHT: 61 IN

## 2018-03-23 DIAGNOSIS — R76.12 POSITIVE QUANTIFERON-TB GOLD TEST: Primary | ICD-10-CM

## 2018-03-23 DIAGNOSIS — M32.9 SLE (SYSTEMIC LUPUS ERYTHEMATOSUS RELATED SYNDROME) (HCC): Primary | ICD-10-CM

## 2018-03-23 DIAGNOSIS — M05.79 RHEUMATOID ARTHRITIS INVOLVING MULTIPLE SITES WITH POSITIVE RHEUMATOID FACTOR (HCC): ICD-10-CM

## 2018-03-23 PROCEDURE — 1090F PRES/ABSN URINE INCON ASSESS: CPT | Performed by: INTERNAL MEDICINE

## 2018-03-23 PROCEDURE — G8484 FLU IMMUNIZE NO ADMIN: HCPCS | Performed by: INTERNAL MEDICINE

## 2018-03-23 PROCEDURE — 1123F ACP DISCUSS/DSCN MKR DOCD: CPT | Performed by: INTERNAL MEDICINE

## 2018-03-23 PROCEDURE — G8400 PT W/DXA NO RESULTS DOC: HCPCS | Performed by: INTERNAL MEDICINE

## 2018-03-23 PROCEDURE — G8420 CALC BMI NORM PARAMETERS: HCPCS | Performed by: INTERNAL MEDICINE

## 2018-03-23 PROCEDURE — G8598 ASA/ANTIPLAT THER USED: HCPCS | Performed by: INTERNAL MEDICINE

## 2018-03-23 PROCEDURE — 4004F PT TOBACCO SCREEN RCVD TLK: CPT | Performed by: INTERNAL MEDICINE

## 2018-03-23 PROCEDURE — 99214 OFFICE O/P EST MOD 30 MIN: CPT | Performed by: INTERNAL MEDICINE

## 2018-03-23 PROCEDURE — G8427 DOCREV CUR MEDS BY ELIG CLIN: HCPCS | Performed by: INTERNAL MEDICINE

## 2018-03-23 PROCEDURE — 3014F SCREEN MAMMO DOC REV: CPT | Performed by: INTERNAL MEDICINE

## 2018-03-23 PROCEDURE — 3017F COLORECTAL CA SCREEN DOC REV: CPT | Performed by: INTERNAL MEDICINE

## 2018-03-23 PROCEDURE — 4040F PNEUMOC VAC/ADMIN/RCVD: CPT | Performed by: INTERNAL MEDICINE

## 2018-03-23 NOTE — PROGRESS NOTES
tuberculosis or atypical infections. Gastrointestinal: No history of heart burn, dysphagia or esophageal dysmotility. No change in bowel habits or any inflammatory bowel disease. Genitourinary: No history renal disease  Hematologic/Lymphatic: No abnormal bruising or bleeding,  swollen lymph nodes. Neurological: No history seizure or focal weakness. No history of neuropathies, paresthesias or hyperesthesias, facial droop, diplopia  Psychiatric: No history of bipolar disease, anxiety, depression  Endocrine: Denies any thyroid / parathyroid disease and osteoporosis  Allergic/Immunologic: No nasal congestion or hives. I have reviewed patients Past medical History, Social History and Family History as mentioned in her chart and this remains unchanged from previous. Past Medical History:   Diagnosis Date    Arthritis     CAD (coronary artery disease)     Coronary atherosclerosis of native coronary vessel     Headache(784.0)     Hyperlipidemia     Hypertension     Osteoarthritis     Pulmonary embolism (HCC)     Type 2 diabetes mellitus without complication (HCC)     Type II or unspecified type diabetes mellitus without mention of complication, not stated as uncontrolled      Past Surgical History:   Procedure Laterality Date    BACK SURGERY      CARDIAC CATHETERIZATION      HAND SURGERY      HIP SURGERY      HYSTERECTOMY      KNEE SURGERY      WRIST SURGERY       Social History     Social History    Marital status:      Spouse name: N/A    Number of children: N/A    Years of education: N/A     Occupational History    Not on file.      Social History Main Topics    Smoking status: Current Some Day Smoker     Packs/day: 1.00     Years: 35.00     Types: E-Cigarettes, Cigarettes    Smokeless tobacco: Never Used    Alcohol use No    Drug use: No    Sexual activity: Not on file     Other Topics Concern    Not on file     Social History Narrative    No narrative on file     Family telangiectasias  Lymphatic:  No lymphadenopathy noted   Neurologic:   Alert & oriented x 3, CN 2-12 normal, no focal deficits noted. Sensations Intact. Muscle strength 5/5 proximally and distally in upper and lower extremities. Psychiatric:  Speech and behavior appropriate           LABS AND IMAGING  Outside data reviewed and in HPI    Lab Results   Component Value Date    WBC 10.2 02/12/2018    RBC 4.32 02/12/2018    HGB 12.7 02/12/2018    HCT 37.6 02/12/2018     02/12/2018    MCV 87.0 02/12/2018    MCH 29.4 02/12/2018    MCHC 33.8 02/12/2018    RDW 14.5 02/12/2018    LYMPHOPCT 10.8 02/12/2018    MONOPCT 6.0 02/12/2018    BASOPCT 0.8 02/12/2018    MONOSABS 0.6 02/12/2018    LYMPHSABS 1.1 02/12/2018    EOSABS 0.1 02/12/2018    BASOSABS 0.1 02/12/2018       Chemistry        Component Value Date/Time     12/22/2017 1141    K 4.7 12/22/2017 1141     12/22/2017 1141    CO2 29 12/22/2017 1141    BUN 15 12/22/2017 1141    CREATININE 0.7 02/12/2018 1358        Component Value Date/Time    CALCIUM 9.8 12/22/2017 1141    ALKPHOS 80 12/22/2017 1141    AST 27 02/12/2018 1358    ALT 20 02/12/2018 1358    BILITOT 0.4 12/22/2017 1141          Lab Results   Component Value Date    SEDRATE 48 (H) 12/22/2017     Lab Results   Component Value Date    CRP 3.0 12/22/2017     Lab Results   Component Value Date    C3 116.1 12/29/2017    C4 19.0 12/29/2017     Lab Results   Component Value Date    RF 48.0 02/26/2014    CCPABIGG 196 02/26/2014     ASSESSMENT AND PLAN    500  Assessment/Plan:      ASSESSMENT:    1. SLE (systemic lupus erythematosus related syndrome) ([facility]    2. Rheumatoid arthritis involving multiple sites with positive rheumatoid factor (HCC)    3. Effect of chemotherapy, initial encounter        PLAN:   1.  SLE (systemic lupus erythematosus related syndrome) ([facility])  ***    2. Rheumatoid arthritis involving multiple sites with positive rheumatoid factor (HCC)  ***  - abatacept (ORENCIA) 250 MG injection; Infuse 500 mg on week 0 and week 2 and then every 4 weeks  Dispense: 1 each; Refill: 5  - Quantiferon TB Gold, (Incubated); Future    3. Effect of chemotherapy, initial encounter  ***    1. Rheumatoid arthritis involving multiple sites with positive rheumatoid factor (HCC)  RF, CCP positive  -Synovitis in wrists   -Unable to taper off prednisone below 10 mg daily  -I will start methotrexate, folic acid and Plaquenil  - continue prednisone 10 mg daily  - methotrexate (RHEUMATREX) 2.5 MG chemo tablet; Take 4 pills/week for 2 weeks and then increase to 6 pills/week  Dispense: 30 tablet; Refill: 1  - folic acid (FOLVITE) 1 MG tablet; Take 1 tablet by mouth daily  Dispense: 30 tablet; Refill: 5  - hydroxychloroquine (PLAQUENIL) 200 MG tablet; Take 1 tablet by mouth 2 times daily  Dispense: 30 tablet; Refill: 5  Methotrexate increases the risk of infections, birth defects, liver toxicity, rare lung problems, probable malignancy and bone marrow toxicity and need to check the blood counts every month for first 3 months and then every 2 months and it was discussed in detail with the patient. Alcohol abstinence is advised while taking methotrexate. 2. SLE (systemic lupus erythematosus related syndrome) (HCC)  Positive HENNY, positive dsDNA,  Blood clots, alopecia, raynauds, dry mouth, positive APL panel, elevated ESR  -Start Plaquenil 200 mg daily  -Urine for proteinuria  -Sun protection with UVA/UVB S 55 or above  -Flu and pneumonia shot  - RIBONUCLEIC PROTEIN ANTIBODY; Future  - Sjogrens syndrome-B extractable nuclear antibody; Future  - Urinalysis Reflex to Culture; Future  - Protein, urine, random; Future  - Creatinine, Random Urine; Future  - hydroxychloroquine (PLAQUENIL) 200 MG tablet; Take 1 tablet by mouth 2 times daily  Dispense: 30 tablet; Refill: 5    3. Effect of chemotherapy, initial encounter  Labs every 4 weeks  - ALT; Standing  - AST;  Standing  - CBC Auto Differential; Standing  - Creatinine, Serum;

## 2018-03-23 NOTE — PROGRESS NOTES
tenderness   Integument:  Well hydrated, no rash or telangiectasias  Lymphatic:  No lymphadenopathy noted   Neurologic:   Alert & oriented x 3, CN 2-12 normal, no focal deficits noted. Sensations Intact. Muscle strength 5/5 proximally and distally in upper and lower extremities. Psychiatric:  Speech and behavior appropriate           LABS AND IMAGING  Outside data reviewed and in HPI    Lab Results   Component Value Date    WBC 10.2 02/12/2018    RBC 4.32 02/12/2018    HGB 12.7 02/12/2018    HCT 37.6 02/12/2018     02/12/2018    MCV 87.0 02/12/2018    MCH 29.4 02/12/2018    MCHC 33.8 02/12/2018    RDW 14.5 02/12/2018    LYMPHOPCT 10.8 02/12/2018    MONOPCT 6.0 02/12/2018    BASOPCT 0.8 02/12/2018    MONOSABS 0.6 02/12/2018    LYMPHSABS 1.1 02/12/2018    EOSABS 0.1 02/12/2018    BASOSABS 0.1 02/12/2018       Chemistry        Component Value Date/Time     12/22/2017 1141    K 4.7 12/22/2017 1141     12/22/2017 1141    CO2 29 12/22/2017 1141    BUN 15 12/22/2017 1141    CREATININE 0.7 02/12/2018 1358        Component Value Date/Time    CALCIUM 9.8 12/22/2017 1141    ALKPHOS 80 12/22/2017 1141    AST 27 02/12/2018 1358    ALT 20 02/12/2018 1358    BILITOT 0.4 12/22/2017 1141          Lab Results   Component Value Date    SEDRATE 48 (H) 12/22/2017     Lab Results   Component Value Date    CRP 3.0 12/22/2017     Lab Results   Component Value Date    C3 116.1 12/29/2017    C4 19.0 12/29/2017     Lab Results   Component Value Date    RF 48.0 02/26/2014    CCPABIGG 196 02/26/2014     ASSESSMENT AND PLAN      Assessment/Plan:      ASSESSMENT:    1. SLE (systemic lupus erythematosus related syndrome) ([facility] [address]    2. Rheumatoid arthritis involving multiple sites with positive rheumatoid factor (HCC)    3. Effect of chemotherapy, initial encounter        PLAN:   1.  SLE (systemic lupus erythematosus related syndrome) ([facility])  Positive HENNY, positive dsDNA,  Blood clots, alopecia, raynauds, dry mouth, positive APL

## 2018-03-26 ENCOUNTER — HOSPITAL ENCOUNTER (OUTPATIENT)
Dept: OTHER | Age: 73
Discharge: HOME | End: 2018-03-26
Attending: INTERNAL MEDICINE | Admitting: INTERNAL MEDICINE

## 2018-03-26 LAB
24HR URINE VOLUME (ML): 1400 ML
ANION GAP SERPL CALCULATED.3IONS-SCNC: 12 MMOL/L (ref 3–16)
BILIRUBIN URINE: NEGATIVE
BLOOD, URINE: NEGATIVE
BUN BLDV-MCNC: 18 MG/DL (ref 7–20)
CALCIUM SERPL-MCNC: 9.2 MG/DL (ref 8.3–10.6)
CHLORIDE BLD-SCNC: 103 MMOL/L (ref 99–110)
CLARITY: CLEAR
CO2: 26 MMOL/L (ref 21–32)
COLOR: YELLOW
CREAT SERPL-MCNC: 0.7 MG/DL (ref 0.6–1.2)
CREAT SERPL-MCNC: 0.7 MG/DL (ref 0.6–1.2)
CREATININE 24 HOUR URINE: 0.7 G/24HR (ref 0.6–1.5)
CREATININE CLEARANCE: 78 ML/MIN (ref 88–128)
CREATININE URINE: 46.8 MG/DL (ref 28–259)
GFR AFRICAN AMERICAN: >60
GFR NON-AFRICAN AMERICAN: >60
GLUCOSE BLD-MCNC: 104 MG/DL (ref 70–99)
GLUCOSE URINE: NEGATIVE MG/DL
HCT VFR BLD CALC: 39.4 % (ref 36–48)
HEMOGLOBIN: 13 G/DL (ref 12–16)
KETONES, URINE: NEGATIVE MG/DL
LEUKOCYTE ESTERASE, URINE: NEGATIVE
Lab: 24 HR
MCH RBC QN AUTO: 29.2 PG (ref 26–34)
MCHC RBC AUTO-ENTMCNC: 33 G/DL (ref 31–36)
MCV RBC AUTO: 88.4 FL (ref 80–100)
MICROALBUMIN UR-MCNC: <1.2 MG/DL
MICROALBUMIN/CREAT UR-RTO: NORMAL MG/G (ref 0–30)
MICROSCOPIC EXAMINATION: NORMAL
NITRITE, URINE: NEGATIVE
PDW BLD-RTO: 14.6 % (ref 12.4–15.4)
PH UA: 6
PHOSPHORUS: 3.9 MG/DL (ref 2.5–4.9)
PLATELET # BLD: 263 K/UL (ref 135–450)
PMV BLD AUTO: 9.5 FL (ref 5–10.5)
POTASSIUM SERPL-SCNC: 4.4 MMOL/L (ref 3.5–5.1)
PROTEIN 24 HOUR URINE: 0.07 G/24HR
PROTEIN PROTEIN: 6 MG/DL
PROTEIN UA: NEGATIVE MG/DL
QUANTIFERON (R) TB GOLD (INCUBATED): NORMAL
QUANTIFERON MITOGEN: 0.18 IU/ML
QUANTIFERON NIL: 0.04 IU/ML
QUANTIFERON TB AG MINUS NIL: 0.01 IU/ML (ref 0–0.34)
RBC # BLD: 4.46 M/UL (ref 4–5.2)
SODIUM BLD-SCNC: 141 MMOL/L (ref 136–145)
SPECIFIC GRAVITY UA: 1.01
URINE TYPE: NORMAL
UROBILINOGEN, URINE: 0.2 E.U./DL
WBC # BLD: 11.5 K/UL (ref 4–11)

## 2018-03-28 ENCOUNTER — HOSPITAL ENCOUNTER (OUTPATIENT)
Dept: ULTRASOUND IMAGING | Age: 73
Discharge: HOME | End: 2018-03-28
Attending: INTERNAL MEDICINE | Admitting: INTERNAL MEDICINE

## 2018-03-28 DIAGNOSIS — L93.0 DISCOID LUPUS ERYTHEMATOSUS: ICD-10-CM

## 2018-03-28 DIAGNOSIS — L93.0 LUPUS ERYTHEMATOSUS, UNSPECIFIED FORM: ICD-10-CM

## 2018-04-04 ENCOUNTER — TELEPHONE (OUTPATIENT)
Dept: INTERNAL MEDICINE CLINIC | Age: 73
End: 2018-04-04

## 2018-04-06 ENCOUNTER — TELEPHONE (OUTPATIENT)
Dept: INTERNAL MEDICINE CLINIC | Age: 73
End: 2018-04-06

## 2018-04-13 ENCOUNTER — TELEPHONE (OUTPATIENT)
Dept: INTERNAL MEDICINE CLINIC | Age: 73
End: 2018-04-13

## 2018-04-17 ENCOUNTER — OFFICE VISIT (OUTPATIENT)
Dept: INFECTIOUS DISEASES | Age: 73
End: 2018-04-17

## 2018-04-17 VITALS
OXYGEN SATURATION: 97 % | HEIGHT: 62 IN | TEMPERATURE: 97.2 F | SYSTOLIC BLOOD PRESSURE: 136 MMHG | WEIGHT: 129 LBS | HEART RATE: 70 BPM | DIASTOLIC BLOOD PRESSURE: 80 MMHG | BODY MASS INDEX: 23.74 KG/M2

## 2018-04-17 DIAGNOSIS — F17.200 SMOKER: ICD-10-CM

## 2018-04-17 DIAGNOSIS — L93.0 LUPUS ERYTHEMATOSUS, UNSPECIFIED FORM: ICD-10-CM

## 2018-04-17 DIAGNOSIS — R76.12 REACTION TO QUANTIFERON-TB TEST: ICD-10-CM

## 2018-04-17 DIAGNOSIS — Z11.1 SCREENING-PULMONARY TB: Primary | ICD-10-CM

## 2018-04-17 DIAGNOSIS — Z71.6 TOBACCO ABUSE COUNSELING: ICD-10-CM

## 2018-04-17 DIAGNOSIS — Z11.4 ENCOUNTER FOR SCREENING FOR HIV: ICD-10-CM

## 2018-04-17 DIAGNOSIS — M05.9 RHEUMATOID ARTHRITIS WITH POSITIVE RHEUMATOID FACTOR, INVOLVING UNSPECIFIED SITE (HCC): ICD-10-CM

## 2018-04-17 DIAGNOSIS — M05.79 RHEUMATOID ARTHRITIS INVOLVING MULTIPLE SITES WITH POSITIVE RHEUMATOID FACTOR (HCC): Chronic | ICD-10-CM

## 2018-04-17 PROCEDURE — 99204 OFFICE O/P NEW MOD 45 MIN: CPT | Performed by: INTERNAL MEDICINE

## 2018-04-17 ASSESSMENT — ENCOUNTER SYMPTOMS
BLURRED VISION: 0
NAUSEA: 0
BACK PAIN: 0
SHORTNESS OF BREATH: 0
EYE DISCHARGE: 0
HEMOPTYSIS: 0
SORE THROAT: 0
COUGH: 1
ABDOMINAL PAIN: 0
SPUTUM PRODUCTION: 0
CONSTIPATION: 0
SINUS PAIN: 0
WHEEZING: 0
DIARRHEA: 0
DOUBLE VISION: 0
EYE REDNESS: 0

## 2018-04-28 ENCOUNTER — HOSPITAL ENCOUNTER (OUTPATIENT)
Dept: CT IMAGING | Age: 73
Discharge: HOME | End: 2018-04-28

## 2018-04-28 DIAGNOSIS — L93.0 DISCOID LUPUS ERYTHEMATOSUS: ICD-10-CM

## 2018-04-28 DIAGNOSIS — L93.0 LUPUS ERYTHEMATOSUS, UNSPECIFIED FORM: ICD-10-CM

## 2018-05-15 ENCOUNTER — TELEPHONE (OUTPATIENT)
Dept: INFECTIOUS DISEASES | Age: 73
End: 2018-05-15

## 2018-05-21 ENCOUNTER — OFFICE VISIT (OUTPATIENT)
Dept: RHEUMATOLOGY | Age: 73
End: 2018-05-21

## 2018-05-21 VITALS
HEIGHT: 62 IN | HEART RATE: 73 BPM | BODY MASS INDEX: 23.26 KG/M2 | OXYGEN SATURATION: 98 % | WEIGHT: 126.4 LBS | SYSTOLIC BLOOD PRESSURE: 122 MMHG | DIASTOLIC BLOOD PRESSURE: 80 MMHG

## 2018-05-21 DIAGNOSIS — M32.9 SLE (SYSTEMIC LUPUS ERYTHEMATOSUS RELATED SYNDROME) (HCC): ICD-10-CM

## 2018-05-21 DIAGNOSIS — M05.79 RHEUMATOID ARTHRITIS INVOLVING MULTIPLE SITES WITH POSITIVE RHEUMATOID FACTOR (HCC): Primary | ICD-10-CM

## 2018-05-21 LAB
C-REACTIVE PROTEIN: 11 MG/L (ref 0–5.1)
C3 COMPLEMENT: 134.7 MG/DL (ref 90–180)
C4 COMPLEMENT: 21.9 MG/DL (ref 10–40)
SEDIMENTATION RATE, ERYTHROCYTE: 30 MM/HR (ref 0–30)

## 2018-05-21 PROCEDURE — 1090F PRES/ABSN URINE INCON ASSESS: CPT | Performed by: INTERNAL MEDICINE

## 2018-05-21 PROCEDURE — 1123F ACP DISCUSS/DSCN MKR DOCD: CPT | Performed by: INTERNAL MEDICINE

## 2018-05-21 PROCEDURE — G8598 ASA/ANTIPLAT THER USED: HCPCS | Performed by: INTERNAL MEDICINE

## 2018-05-21 PROCEDURE — G8420 CALC BMI NORM PARAMETERS: HCPCS | Performed by: INTERNAL MEDICINE

## 2018-05-21 PROCEDURE — 99214 OFFICE O/P EST MOD 30 MIN: CPT | Performed by: INTERNAL MEDICINE

## 2018-05-21 PROCEDURE — G8400 PT W/DXA NO RESULTS DOC: HCPCS | Performed by: INTERNAL MEDICINE

## 2018-05-21 PROCEDURE — 3017F COLORECTAL CA SCREEN DOC REV: CPT | Performed by: INTERNAL MEDICINE

## 2018-05-21 PROCEDURE — 4040F PNEUMOC VAC/ADMIN/RCVD: CPT | Performed by: INTERNAL MEDICINE

## 2018-05-21 PROCEDURE — G8427 DOCREV CUR MEDS BY ELIG CLIN: HCPCS | Performed by: INTERNAL MEDICINE

## 2018-05-21 PROCEDURE — 4004F PT TOBACCO SCREEN RCVD TLK: CPT | Performed by: INTERNAL MEDICINE

## 2018-05-21 RX ORDER — PREDNISONE 1 MG/1
TABLET ORAL
Qty: 180 TABLET | Refills: 0 | Status: SHIPPED | OUTPATIENT
Start: 2018-05-21

## 2018-05-24 LAB — DOUBLE STRANDED DNA AB, IGG: DETECTED

## 2018-05-27 LAB — DSDNA ANTIBODY TITER: ABNORMAL

## 2018-06-06 ENCOUNTER — TELEPHONE (OUTPATIENT)
Dept: INTERNAL MEDICINE CLINIC | Age: 73
End: 2018-06-06

## 2018-06-07 DIAGNOSIS — M32.9 SLE (SYSTEMIC LUPUS ERYTHEMATOSUS RELATED SYNDROME) (HCC): ICD-10-CM

## 2018-06-07 DIAGNOSIS — M05.79 RHEUMATOID ARTHRITIS INVOLVING MULTIPLE SITES WITH POSITIVE RHEUMATOID FACTOR (HCC): ICD-10-CM

## 2018-06-07 RX ORDER — HYDROXYCHLOROQUINE SULFATE 200 MG/1
200 TABLET, FILM COATED ORAL DAILY
Qty: 30 TABLET | Refills: 5 | Status: SHIPPED | OUTPATIENT
Start: 2018-06-07